# Patient Record
Sex: FEMALE | Race: WHITE | NOT HISPANIC OR LATINO | Employment: FULL TIME | URBAN - NONMETROPOLITAN AREA
[De-identification: names, ages, dates, MRNs, and addresses within clinical notes are randomized per-mention and may not be internally consistent; named-entity substitution may affect disease eponyms.]

---

## 2017-03-23 ENCOUNTER — HOSPITAL ENCOUNTER (OUTPATIENT)
Dept: PHYSICAL THERAPY | Facility: HOSPITAL | Age: 27
Setting detail: THERAPIES SERIES
Discharge: HOME | End: 2017-03-23

## 2017-03-23 DIAGNOSIS — M54.50 ACUTE BILATERAL LOW BACK PAIN WITHOUT SCIATICA: Primary | ICD-10-CM

## 2017-03-23 PROCEDURE — 97163 PT EVAL HIGH COMPLEX 45 MIN: CPT | Performed by: PHYSICAL THERAPIST

## 2017-03-23 PROCEDURE — 97010 HOT OR COLD PACKS THERAPY: CPT | Performed by: PHYSICAL THERAPIST

## 2017-03-23 PROCEDURE — G0283 ELEC STIM OTHER THAN WOUND: HCPCS | Performed by: PHYSICAL THERAPIST

## 2017-03-23 NOTE — PROGRESS NOTES
Outpatient Physical Therapy Ortho Initial Evaluation   [place]     Patient Name: Jolly Ayala  : 1990  MRN: [id number]  Today's Date: 3/23/2017      Visit Date: 2017    There is no problem list on file for this patient.       History reviewed. No pertinent past medical history.     History reviewed. No pertinent surgical history.    Visit Dx:     ICD-10-CM ICD-9-CM   1. Acute bilateral low back pain without sciatica M54.5 724.2     338.19             Patient History       17 1300          History    Chief Complaint Pain  -AD      Type of Pain Back pain  -AD      Date Current Problem(s) Began 17  -AD      Brief Description of Current Complaint Pt reported doing 55 dead lifts during cross fit, while wearing a belt. She stated after she did the dead lifts and attempted wall balls, she experienced low back pain. She stated she woke up the next morning with severe pain, bringing her to tears. She reports she used a TENS unit which provided no relief. She states laying down flat provides relief, but once she has to go from sit<>stand she experiences an increase in pain. She reports attempting to work out yesterday, but experienced increased pain this morning. She reports being a  and is having difficulty performing her daily work activities, including getting into and out of cars.    -AD      Onset Date- PT 3/23/17  -AD      Patient/Caregiver Goals Relieve pain;Return to prior level of function;Improve mobility;Improve strength  -AD      Current Tobacco Use None  -AD      Smoking Status Non  -AD      Patient's Rating of General Health Very good  -AD      Hand Dominance right-handed  -AD      Occupation/sports/leisure activities   -AD      How has patient tried to help current problem? Massage  -AD      Are you or can you be pregnant No  -AD      Pain     Pain Location Back  -AD      Pain at Present 4  -AD      Pain at Best 4  -AD      Pain at Worst 9   Pt reports  gradual improvement  -AD      Pain Frequency Constant/continuous  -AD      Pain Description Sharp  -AD      What Performance Factors Make the Current Problem(s) WORSE? Movement, bending  -AD      What Performance Factors Make the Current Problem(s) BETTER? Rest, lying flat  -AD      Difficulties at work? Running, getting into and out of cars  -AD      Difficulties with ADL's? Washing, dressing  -AD      Fall Risk Assessment    Any falls in the past year: No  -AD      Daily Activities    Primary Language English  -AD      Are you able to read Yes  -AD      Are you able to write Yes  -AD      How does patient learn best? Listening  -AD      Pt Participated in POC and Goals Yes  -AD      Safety    Are you being hurt, hit, or frightened by anyone at home or in your life? No  -AD      Are you being neglected by a caregiver No  -AD        User Key  (r) = Recorded By, (t) = Taken By, (c) = Cosigned By    Initials Name Provider Type    AD Ashley Claudene Dalton, PT Physical Therapist                PT Ortho       03/23/17 1300    Subjective Pain    Able to rate subjective pain? yes  -AD    Pre-Treatment Pain Level 4  -AD    Post-Treatment Pain Level 3  -AD    Posture/Observations    Posture/Observations Comments Left lateral lean, anterior rotation of left innominate   -AD    Sensation    Sensation WNL? WNL  -AD    DTR- Lower Quarter Clearing    Patellar tendon (L2-4) Bilateral:;2- Normal response  -AD    Achilles tendon (S1-2) Bilateral:;2- Normal response  -AD    Neural Tension Signs- Lower Quarter Clearing    Slump Bilateral:;Negative  -AD    SLR Bilateral:;Negative  -AD    Pathological Reflexes- Lower Quarter Clearing    Clonus Bilateral:;Negative  -AD    Sensory Screen for Light Touch- Lower Quarter Clearing    L1 (inguinal area) Bilateral:;Intact  -AD    L2 (anterior mid thigh) Bilateral:;Intact  -AD    L3 (distal anterior thigh) Bilateral:;Intact  -AD    L4 (medial lower leg/foot) Bilateral:;Intact  -AD    L5  (lateral lower leg/great toe) Bilateral:;Intact  -AD    S1 (bottom of foot) Bilateral:;Intact  -AD    Myotomal Screen- Lower Quarter Clearing    Hip flexion (L2) Right:;4+ (Good +);Left:;4 (Good)  -AD    Knee extension (L3) Bilateral:;4+ (Good +)  -AD    Knee flexion (S2) Right:;4+ (Good +);Left:;4 (Good)  -AD    Lumbar ROM Screen- Lower Quarter Clearing    Lumbar Flexion --   75%  -AD    Lumbar Extension --   25%  -AD    Lumbar Lateral Flexion --   Bilateral 50%  -AD    Lumbar Rotation --   Left 50%, right 75%  -AD    SI/Hip Screen- Lower Quarter Clearing    ASIS compression Bilateral:;Negative  -AD    ASIS distraction Bilateral:;Negative  -AD    Solomon's/Samuel's test Bilateral:;Negative  -AD    Posterior thigh sheer Bilateral:;Negative  -AD    Gait Assessment/Treatment    Gait, Impairments ROM decreased;pain;unable to maintain weight bearing status  -AD    Gait, Comment Left lateral lean with decreased RLE weightbearing  -AD      User Key  (r) = Recorded By, (t) = Taken By, (c) = Cosigned By    Initials Name Provider Type    AD Ashley Claudene Dalton, PT Physical Therapist                            Therapy Education       03/23/17 1437          Therapy Education    Given HEP;Pain management;Posture/body mechanics;Mobility training  -AD      Program New  -AD      How Provided Verbal;Demonstration;Written  -AD      Provided to Patient  -AD      Level of Understanding Verbalized;Demonstrated;Teach back education performed  -AD        User Key  (r) = Recorded By, (t) = Taken By, (c) = Cosigned By    Initials Name Provider Type    AD Ashley Claudene Dalton, PT Physical Therapist                PT OP Goals       03/23/17 1400       PT Short Term Goals    STG Date to Achieve 04/06/17  -AD     STG 1 Pt will demonstrate proper understanding of HEP for improved independence.  -AD     STG 1 Progress New  -AD     STG 2 Pt will demonstrate a 25% improvement on lumbar range of motion for improved mobility and function.  -AD      STG 2 Progress New  -AD     Long Term Goals    LTG Date to Achieve 04/22/17  -AD     LTG 1 Pt will report less than 10% impairment on the Modified Oswestry for improved functional independence at home and work.  -AD     LTG 1 Progress New  -AD     LTG 2 Pt will report no more than 1/10 low back pain during work activities for improved function and safety.  -AD     LTG 2 Progress New  -AD     LTG 3 Pt will demonstrate lumbar ROM within normal limits for improved mobility.  -AD     LTG 3 Progress New  -AD     LTG 4 Pt will perform dynamic strengthening activities with proper form and no increase in pain.  -AD     LTG 4 Progress New  -AD     Time Calculation    PT Goal Re-Cert Due Date 04/22/17  -AD       User Key  (r) = Recorded By, (t) = Taken By, (c) = Cosigned By    Initials Name Provider Type    AD Ashley Claudene Dalton, PT Physical Therapist                PT Assessment/Plan       03/23/17 1442       PT Assessment    Functional Limitations Impaired gait;Limitation in home management;Limitations in community activities;Performance in work activities;Performance in sport activities;Performance in self-care ADL;Performance in leisure activities;Limitations in functional capacity and performance;Decreased safety during functional activities  -AD     Impairments Gait;Range of motion;Motor function;Muscle strength;Pain;Posture;Joint mobility;Joint integrity  -AD     Assessment Comments The patient is  27 year old  presenting to the clinic with low back pain. She injured her lower back while participating in a cross fit workout. Due to the pain, she is currently limited with lumbar range of motion and BLE strength. She has difficulty transferring from sit<>stand, running, and getting in/out of cars easily due to pain. Per pt report, these limitations are affecting her ability to perform job duties.Upon inspection, the patient's left innominate was rotated anteriorly resulting in apparent leg length  discrepancy. Muscle energy techniques were used to correct the rotation with landmarks appearing even upon inspection/palpation. She would benefit from skilled physical therapy and will be progressed as tolerated.  -AD     Please refer to paper survey for additional self-reported information Yes  -AD     Rehab Potential Excellent  -AD     Patient/caregiver participated in establishment of treatment plan and goals Yes  -AD     Patient would benefit from skilled therapy intervention Yes  -AD     PT Plan    PT Frequency 2x/week;3x/week  -AD     Predicted Duration of Therapy Intervention (days/wks) 4 weeks  -AD     Planned CPT's? PT EVAL HIGH COMPLEXITY: 45119;PT RE-EVAL: 36490;PT THER PROC EA 15 MIN: 65548;PT GAIT TRAINING EA 15 MIN: 15634;PT NEUROMUSC RE-EDUCATION EA 15 MIN: 80561;PT MANUAL THERAPY EA 15 MIN: 45056;PT THER ACT EA 15 MIN: 13136;PT SELF CARE/HOME MGMT/TRAIN EA 15: 67612;PT ELECTRICAL STIM UNATTEND: ;PT ULTRASOUND EA 15 MIN: 17580;PT THER SUPP EA 15 MIN;PT IONTOPHORESIS EA 15 MIN: 97028  -AD     Physical Therapy Interventions (Optional Details) balance training;gait training;gross motor skills;home exercise program;joint mobilization;postural re-education;patient/family education;neuromuscular re-education;motor coordination training;stretching;modalities;strengthening;manual therapy techniques;lumbar stabilization;ROM (Range of Motion)  -AD     PT Plan Comments The above noted interventions will be used to address functional limitations and impairments previously discussed. She has an excellent prognosis to achieve established physical therapy goals and will be progressed as tolerated.  -AD       User Key  (r) = Recorded By, (t) = Taken By, (c) = Cosigned By    Initials Name Provider Type    AD Ashley Claudene Dalton, PT Physical Therapist                Modalities       03/23/17 1300          Moist Heat     Applied Yes  -AD      Location Low back  -AD      Rx Minutes 15 mins  -AD       S/P Rx  Yes   With IFC, no adverse reactions observed  -AD      ELECTRICAL STIMULATION    Attended/Unattended Unattended  -AD      Stimulation Type IFC  -AD      Max mAmp --   Per pt tolerance  -AD      Location/Electrode Placement/Other Lumbar  -AD      Rx Minutes 15 mins  -AD        User Key  (r) = Recorded By, (t) = Taken By, (c) = Cosigned By    Initials Name Provider Type    AD Ashley Claudene Dalton, PT Physical Therapist              Exercises       03/23/17 1300          Subjective Pain    Able to rate subjective pain? yes  -AD      Pre-Treatment Pain Level 4  -AD      Post-Treatment Pain Level 3  -AD      Exercise 1    Exercise Name 1 SKTC, piriformis stretch, LTR  -AD      Cueing 1 Verbal;Tactile;Demo   HEP  -AD        User Key  (r) = Recorded By, (t) = Taken By, (c) = Cosigned By    Initials Name Provider Type    AD Ashley Claudene Dalton, PT Physical Therapist                              Outcome Measures       03/23/17 1400          Modified Oswestry    Modified Oswestry Score/Comments 16/50  -AD      Functional Assessment    Outcome Measure Options Modifed Owestry  -AD        User Key  (r) = Recorded By, (t) = Taken By, (c) = Cosigned By    Initials Name Provider Type    AD Ashley Claudene Dalton, PT Physical Therapist            Time Calculation:   Start Time: 1315  Stop Time: 1435  Time Calculation (min): 80 min     Therapy Charges for Today     Code Description Service Date Service Provider Modifiers Qty    50887800273 HC ELECTRICAL STIM UNATTENDED 3/23/2017 Ashley Claudene Dalton, PT  1    71537740007 HC PT HOT/COLD PACK WC NONMCARE 3/23/2017 Ashley Claudene Dalton, PT GP 1    80483904103 HC PT EVAL HIGH COMPLEXITY 4 3/23/2017 Ashley Claudene Dalton, PT GP 1          PT G-Codes  Outcome Measure Options: Modifed Owestry         Ashley Claudene Dalton, PT  3/23/2017

## 2017-03-29 ENCOUNTER — TRANSCRIBE ORDERS (OUTPATIENT)
Dept: PHYSICAL THERAPY | Facility: HOSPITAL | Age: 27
End: 2017-03-29

## 2017-03-29 DIAGNOSIS — M54.5 LOW BACK PAIN, UNSPECIFIED BACK PAIN LATERALITY, UNSPECIFIED CHRONICITY, WITH SCIATICA PRESENCE UNSPECIFIED: Primary | ICD-10-CM

## 2017-04-05 ENCOUNTER — HOSPITAL ENCOUNTER (OUTPATIENT)
Dept: PHYSICAL THERAPY | Facility: HOSPITAL | Age: 27
Setting detail: THERAPIES SERIES
Discharge: HOME | End: 2017-04-05

## 2017-04-05 DIAGNOSIS — M54.50 ACUTE BILATERAL LOW BACK PAIN WITHOUT SCIATICA: Primary | ICD-10-CM

## 2017-04-05 PROCEDURE — G0283 ELEC STIM OTHER THAN WOUND: HCPCS

## 2017-04-05 PROCEDURE — 97110 THERAPEUTIC EXERCISES: CPT

## 2017-04-05 NOTE — PROGRESS NOTES
Outpatient Physical Therapy Ortho Treatment Note   [place]     Patient Name: Jolly Ayala  : 1990  MRN: [id number]  Today's Date: 2017      Visit Date: 2017    Visit Dx:    ICD-10-CM ICD-9-CM   1. Acute bilateral low back pain without sciatica M54.5 724.2     338.19       There is no problem list on file for this patient.       No past medical history on file.     No past surgical history on file.          PT Ortho       17 1200    Subjective Comments    Subjective Comments Patient states of continued pain in her back. Patient reports of not performing exercises at the gym due to her back pain.   -    Subjective Pain    Able to rate subjective pain? yes  -    Pre-Treatment Pain Level 4  -    Post-Treatment Pain Level 3  -      User Key  (r) = Recorded By, (t) = Taken By, (c) = Cosigned By    Initials Name Provider Type    JOSE DAVID Rogers PTA Physical Therapy Assistant                            PT Assessment/Plan       17 1227       PT Assessment    Assessment Comments Patient tolerated treatment good with no increase in pain noted during treatment. New ther ex added per the patient's tolerance, patient demonstrated and understood new ther ex with no increase in pain. Educated patient to perform ther ex per her tolerance, patient verbalized understanding. Jolly Orellana, PT, DPT assessed innominate for any rotations, none were noted. No adverse reactions with modalities or treatment.   -     PT Plan    PT Plan Comments Continue per PT's POC, progress per the patient's tolerance.  -       User Key  (r) = Recorded By, (t) = Taken By, (c) = Cosigned By    Initials Name Provider Type    JOSE DAVID Rogers PTA Physical Therapy Assistant                Modalities       17 1200          Moist Heat    MH Applied Yes   No redness noted following moist heat  -      Location Low back  -      Rx Minutes 15 mins  -      MH S/P Rx Yes  -      ELECTRICAL  STIMULATION    Attended/Unattended Unattended   No irritation noted following estim  -AH      Stimulation Type IFC  -      Max mAmp --   per the patient's tolerance  -      Location/Electrode Placement/Other Lumbar  -      Rx Minutes 15 mins  -        User Key  (r) = Recorded By, (t) = Taken By, (c) = Cosigned By    Initials Name Provider Type    JOSE DAVID Rogers PTA Physical Therapy Assistant                Exercises       04/05/17 1200          Subjective Comments    Subjective Comments Patient states of continued pain in her back. Patient reports of not performing exercises at the gym due to her back pain.   -      Subjective Pain    Able to rate subjective pain? yes  -      Pre-Treatment Pain Level 4  -      Post-Treatment Pain Level 3  -      Exercise 1    Exercise Name 1 SKTC 20 sec hold x3, piriformis stretch 20 sec hold x3, LTR 25x, PPT 10x2, QS 10x2, SLR 10x2, knee flex with GTB x25, cat camel x15, bird dogs x10, mini squats 15x, lat pull with GTB 15x, LE bike LV 2 8 min   Jolly Orellana PT, DPT assisted with beginning ther ex.  -      Cueing 1 Verbal;Tactile;Demo  -      Time (Minutes) 1 40 minutes  -        User Key  (r) = Recorded By, (t) = Taken By, (c) = Cosigned By    Initials Name Provider Type    JOSE DAVID Rogers PTA Physical Therapy Assistant                                   Therapy Education       04/05/17 1226          Therapy Education    Given HEP;Pain management;Symptoms/condition management;Posture/body mechanics  -      Program New  -      How Provided Verbal  -      Provided to Patient  -      Level of Understanding Verbalized;Demonstrated;Teach back education performed  -        User Key  (r) = Recorded By, (t) = Taken By, (c) = Cosigned By    Initials Name Provider Type    JOSE DAVID Rogers PTA Physical Therapy Assistant                Time Calculation:   Start Time: 1000  Stop Time: 1100  Time Calculation (min): 60 min    Therapy  Charges for Today     Code Description Service Date Service Provider Modifiers Qty    82974768911 HC PT THER PROC EA 15 MIN 4/5/2017 Jolly Rogers PTA GP 3    87062398032 HC PT ELECTRICAL STIM UNATTENDED 4/5/2017 Jolly Rogers PTA  1    [id number]  PT THER SUPP EA 15 MIN 4/5/2017 Jolly Rogers PTA GP 1                    Jolly Rogers, ARSENIO  4/5/2017

## 2017-04-06 ENCOUNTER — HOSPITAL ENCOUNTER (OUTPATIENT)
Dept: PHYSICAL THERAPY | Facility: HOSPITAL | Age: 27
Setting detail: THERAPIES SERIES
Discharge: HOME | End: 2017-04-06

## 2017-04-06 DIAGNOSIS — M54.50 ACUTE BILATERAL LOW BACK PAIN WITHOUT SCIATICA: Primary | ICD-10-CM

## 2017-04-06 PROCEDURE — 97110 THERAPEUTIC EXERCISES: CPT

## 2017-04-06 PROCEDURE — 97010 HOT OR COLD PACKS THERAPY: CPT

## 2017-04-06 PROCEDURE — G0283 ELEC STIM OTHER THAN WOUND: HCPCS

## 2017-04-06 NOTE — PROGRESS NOTES
Outpatient Physical Therapy Ortho Treatment Note   [place]     Patient Name: Jolly Ayala  : 1990  MRN: [id number]  Today's Date: 2017      Visit Date: 2017    Visit Dx:    ICD-10-CM ICD-9-CM   1. Acute bilateral low back pain without sciatica M54.5 724.2     338.19       There is no problem list on file for this patient.       No past medical history on file.     No past surgical history on file.          PT Ortho       17 1200    Subjective Comments    Subjective Comments Patient reports that she was sore after last session but she wants to do more activities. Patient reports still not performing cross fit activities  -    Subjective Pain    Able to rate subjective pain? yes  -    Pre-Treatment Pain Level 3  -    Post-Treatment Pain Level 3  -      17 1200    Subjective Comments    Subjective Comments Patient states of continued pain in her back. Patient reports of not performing exercises at the gym due to her back pain.   -    Subjective Pain    Able to rate subjective pain? yes  -    Pre-Treatment Pain Level 4  -    Post-Treatment Pain Level 3  -      User Key  (r) = Recorded By, (t) = Taken By, (c) = Cosigned By    Initials Name Provider Type     Jolly Rogers, PTA Physical Therapy Assistant                            PT Assessment/Plan       17 1258       PT Assessment    Assessment Comments New ther ex added per the patient's tolerance, patient demonstrated and understood new ther ex with no increase in pain. Patient tolerated treatment well with mild increase in soreness with cat camels. Jolly Orellana PT, DPT assessed patient during push ups and patient continues to dip her R hip so push ups stopped at this time until low back is strengthed. No adverse reactions with modalities or treatment.   -     PT Plan    PT Plan Comments Continue per PT's POC, progress per the patient's tolerance.  -       User Key  (r) = Recorded By, (t) = Taken  By, (c) = Cosigned By    Initials Name Provider Type     Jolly oRgers PTA Physical Therapy Assistant                Modalities       04/06/17 1200          Moist Heat    MH Applied Yes   No redness noted following moist heat  -      Location Low back  -AH      Rx Minutes 15 mins  -AH      MH Prior to Rx Yes  -AH      ELECTRICAL STIMULATION    Attended/Unattended Unattended   No irritation noted following estim  -AH      Stimulation Type IFC  -AH      Max mAmp --   per the patient's tolerance  -      Location/Electrode Placement/Other Lumbar  -AH      Rx Minutes 15 mins  -        User Key  (r) = Recorded By, (t) = Taken By, (c) = Cosigned By    Initials Name Provider Type     Jolly Rogers PTA Physical Therapy Assistant                Exercises       04/06/17 1200          Subjective Comments    Subjective Comments Patient reports that she was sore after last session but she wants to do more activities. Patient reports still not performing cross fit activities  -      Subjective Pain    Able to rate subjective pain? yes  -AH      Pre-Treatment Pain Level 3  -AH      Post-Treatment Pain Level 3  -      Exercise 1    Exercise Name 1 SKTC 20 sec hold x3, piriformis stretch 20 sec hold x3, LTR 15x2, SLR 1# 10x3, knee flex with GTB 15x2, cat camel 15x, bird dogs 15x, mini squats 10x2, lat pull with GTB 15x2, mid row with GTB 10x2, St. hip abd 15x, wall push up 15x, SLS 20 sec, lunges 15x, push ups 5x, swiss ball opposite arm/leg 15x  -      Cueing 1 Verbal;Tactile;Demo  -      Time (Minutes) 1 40 minutes  -      Treatment Type 1 Ther Ex  -        User Key  (r) = Recorded By, (t) = Taken By, (c) = Cosigned By    Initials Name Provider Type     Jolly Rogers PTA Physical Therapy Assistant                                   Therapy Education       04/06/17 1253          Therapy Education    Given HEP;Symptoms/condition management;Pain management;Posture/body mechanics  -       Program New  -      How Provided Verbal  -AH      Provided to Patient  -      Level of Understanding Verbalized;Demonstrated  -AH        User Key  (r) = Recorded By, (t) = Taken By, (c) = Cosigned By    Initials Name Provider Type     Jolly Rogers PTA Physical Therapy Assistant                Time Calculation:   Start Time: 1000  Stop Time: 1100  Time Calculation (min): 60 min    Therapy Charges for Today     Code Description Service Date Service Provider Modifiers Qty    35921036824 HC PT THER SUPP EA 15 MIN 4/6/2017 Jolly Rogers PTA GP 1    01565461729 HC PT THER PROC EA 15 MIN 4/6/2017 Jolly Rogers PTA GP 3    10340629082 HC PT ELECTRICAL STIM UNATTENDED 4/6/2017 Jolly Rogers PTA  1    29142094379 HC PT HOT/COLD PACK WC NONMCARE 4/6/2017 Jolly Rogers PTA GP 1                    Jolly Rogers, PTA  4/6/2017

## 2017-04-11 ENCOUNTER — HOSPITAL ENCOUNTER (OUTPATIENT)
Dept: PHYSICAL THERAPY | Facility: HOSPITAL | Age: 27
Setting detail: THERAPIES SERIES
Discharge: HOME | End: 2017-04-11

## 2017-04-11 DIAGNOSIS — M54.50 ACUTE BILATERAL LOW BACK PAIN WITHOUT SCIATICA: Primary | ICD-10-CM

## 2017-04-11 PROCEDURE — 97010 HOT OR COLD PACKS THERAPY: CPT | Performed by: PHYSICAL THERAPIST

## 2017-04-11 PROCEDURE — 97110 THERAPEUTIC EXERCISES: CPT | Performed by: PHYSICAL THERAPIST

## 2017-04-11 PROCEDURE — G0283 ELEC STIM OTHER THAN WOUND: HCPCS | Performed by: PHYSICAL THERAPIST

## 2017-04-11 NOTE — PROGRESS NOTES
Outpatient Physical Therapy Ortho Treatment Note   Kranthi     Patient Name: Jolly Ayala  : 1990  MRN: [id number]  Today's Date: 2017      Visit Date: 2017    Visit Dx:    ICD-10-CM ICD-9-CM   1. Acute bilateral low back pain without sciatica M54.5 724.2     338.19       There is no problem list on file for this patient.       No past medical history on file.     No past surgical history on file.          PT Ortho       17 1100    Subjective Comments    Subjective Comments Pt reported mild soreness prior to today's treatment session, as well as following her session on 17. She states she has been performing her HEP as instructed.  -AD    Subjective Pain    Able to rate subjective pain? yes  -AD    Pre-Treatment Pain Level 3  -AD    Post-Treatment Pain Level 3  -AD      User Key  (r) = Recorded By, (t) = Taken By, (c) = Cosigned By    Initials Name Provider Type    AD Ashley Claudene Dalton, PT Physical Therapist                            PT Assessment/Plan       17 1126       PT Assessment    Assessment Comments Pt tolerated the session well with no reports of increased pain. She was progressed with therapeutic exercises to include increased resistance and core involvement. She will continue to be progressed to improve core strength, lumbar stability, mobility, and function.   -AD     PT Plan    PT Plan Comments Progress as tolerated per POC.  -AD       User Key  (r) = Recorded By, (t) = Taken By, (c) = Cosigned By    Initials Name Provider Type    AD Ashley Claudene Dalton, PT Physical Therapist                Modalities       17 1100          Moist Heat    Location Low back  -AD      Rx Minutes 10 mins  -AD      MH Prior to Rx Yes  -AD      ELECTRICAL STIMULATION    Attended/Unattended Unattended   No irritation noted following estim, combined with MH  -AD      Stimulation Type IFC  -AD      Max mAmp --   per the patient's tolerance  -AD      Location/Electrode  "Placement/Other Lumbar  -AD      Rx Minutes 10 mins  -AD        User Key  (r) = Recorded By, (t) = Taken By, (c) = Cosigned By    Initials Name Provider Type    AD Ashley Claudene Dalton, PT Physical Therapist                Exercises       04/11/17 1100          Subjective Comments    Subjective Comments Pt reported mild soreness prior to today's treatment session, as well as following her session on 4/6/17. She states she has been performing her HEP as instructed.  -AD      Subjective Pain    Able to rate subjective pain? yes  -AD      Pre-Treatment Pain Level 3  -AD      Post-Treatment Pain Level 3  -AD      Exercise 1    Exercise Name 1 SKTC, piriformis stretch, LTR, LAQ with 1#, tband knee flexion, tband hip abduction, standing hip abduction, lunges, \"w\" lunges, side step with GTB, mini squats, lat pull, planks, quadruped alternating arm/leg, planks, plank with oblique involvement, LE bicycle  -AD      Cueing 1 Verbal;Tactile  -AD      Time (Minutes) 1 55 minutes  -AD      Treatment Type 1 Ther Ex  -AD        User Key  (r) = Recorded By, (t) = Taken By, (c) = Cosigned By    Initials Name Provider Type    AD Ashley Claudene Dalton, PT Physical Therapist                                   Therapy Education       04/11/17 1126          Therapy Education    Given HEP;Symptoms/condition management;Pain management;Posture/body mechanics  -AD      Program Reinforced  -AD      How Provided Verbal  -AD      Provided to Patient  -AD      Level of Understanding Verbalized;Demonstrated  -AD        User Key  (r) = Recorded By, (t) = Taken By, (c) = Cosigned By    Initials Name Provider Type    AD Ashley Claudene Dalton, PT Physical Therapist                Time Calculation:   Start Time: 1000  Stop Time: 1110  Time Calculation (min): 70 min    Therapy Charges for Today     Code Description Service Date Service Provider Modifiers Qty    79171756607 HC PT HOT/COLD PACK WC NONMCARE 4/11/2017 Ashley Claudene Dalton, PT GP 1    " 67260808816 HC PT ELECTRICAL STIM UNATTENDED 4/11/2017 Ashley Claudene Dalton, PT  1    07356510964 HC PT THER PROC EA 15 MIN 4/11/2017 Ashley Claudene Dalton, PT GP 4                    Ashley Claudene Dalton, PT  4/11/2017

## 2017-04-14 ENCOUNTER — HOSPITAL ENCOUNTER (OUTPATIENT)
Dept: PHYSICAL THERAPY | Facility: HOSPITAL | Age: 27
Setting detail: THERAPIES SERIES
Discharge: HOME | End: 2017-04-14

## 2017-04-14 DIAGNOSIS — M54.50 ACUTE BILATERAL LOW BACK PAIN WITHOUT SCIATICA: Primary | ICD-10-CM

## 2017-04-14 PROCEDURE — 97010 HOT OR COLD PACKS THERAPY: CPT | Performed by: PHYSICAL THERAPIST

## 2017-04-14 PROCEDURE — 97110 THERAPEUTIC EXERCISES: CPT | Performed by: PHYSICAL THERAPIST

## 2017-04-14 PROCEDURE — G0283 ELEC STIM OTHER THAN WOUND: HCPCS | Performed by: PHYSICAL THERAPIST

## 2017-04-14 NOTE — PROGRESS NOTES
Outpatient Physical Therapy Ortho Treatment Note   Kranthi     Patient Name: Jolly Ayala  : 1990  MRN: [id number]  Today's Date: 2017      Visit Date: 2017    Visit Dx:    ICD-10-CM ICD-9-CM   1. Acute bilateral low back pain without sciatica M54.5 724.2     338.19       There is no problem list on file for this patient.       No past medical history on file.     No past surgical history on file.          PT Ortho       17 1100    Subjective Comments    Subjective Comments Pt reported mild soreness prior to the treatment session.  -AD    Subjective Pain    Able to rate subjective pain? yes  -AD    Pre-Treatment Pain Level 2  -AD    Post-Treatment Pain Level 1  -AD      User Key  (r) = Recorded By, (t) = Taken By, (c) = Cosigned By    Initials Name Provider Type    AD Ashley Claudene Dalton, PT Physical Therapist                            PT Assessment/Plan       17 1106       PT Assessment    Assessment Comments Pt tolerated session well with progression to include light jog on the treadmill. The patient also performed therapeutic exercises with a 3# ankle weight. She reported low back tightness following the treadmill, therefore low back stretches were repeated. She will continue to be progressed as tolerated.  -AD     PT Plan    PT Plan Comments Progress as tolerated per POC.  -AD       User Key  (r) = Recorded By, (t) = Taken By, (c) = Cosigned By    Initials Name Provider Type    AD Ashley Claudene Dalton, PT Physical Therapist                Modalities       17 1100          Moist Heat    MH Applied Yes  -AD      Location Low back  -AD      Rx Minutes 10 mins  -AD      MH Prior to Rx Yes  -AD      ELECTRICAL STIMULATION    Attended/Unattended Unattended   No irritation noted following estim, combined with MH  -AD      Stimulation Type IFC  -AD      Max mAmp --   per the patient's tolerance  -AD      Location/Electrode Placement/Other Lumbar  -AD      Rx Minutes 10  "mins  -AD        User Key  (r) = Recorded By, (t) = Taken By, (c) = Cosigned By    Initials Name Provider Type    AD Ashley Claudene Dalton, PT Physical Therapist                Exercises       04/14/17 1100          Subjective Comments    Subjective Comments Pt reported mild soreness prior to the treatment session.  -AD      Subjective Pain    Able to rate subjective pain? yes  -AD      Pre-Treatment Pain Level 2  -AD      Post-Treatment Pain Level 1  -AD      Exercise 1    Exercise Name 1 SKTC, piriformis stretch, LTR, bridges, LAQ with 3#, seated march with 3#, lunges with 3#, side step with 3#, \"w\" walk with 3#, treadmill run for 5 min, prostretch.  -AD      Time (Minutes) 1 40 minutes  -AD      Treatment Type 1 Ther Ex  -AD        User Key  (r) = Recorded By, (t) = Taken By, (c) = Cosigned By    Initials Name Provider Type    AD Ashley Claudene Dalton, PT Physical Therapist                                   Therapy Education       04/14/17 1106          Therapy Education    Given HEP;Symptoms/condition management;Pain management;Posture/body mechanics  -AD      Program Reinforced  -AD      How Provided Verbal  -AD      Provided to Patient  -AD      Level of Understanding Verbalized;Demonstrated  -AD        User Key  (r) = Recorded By, (t) = Taken By, (c) = Cosigned By    Initials Name Provider Type    AD Ashley Claudene Dalton, PT Physical Therapist                Time Calculation:   Start Time: 1010  Stop Time: 1100  Time Calculation (min): 50 min    Therapy Charges for Today     Code Description Service Date Service Provider Modifiers Qty    53716674798 HC PT ELECTRICAL STIM UNATTENDED 4/14/2017 Ashley Claudene Dalton, PT  1    99048110789 HC PT HOT/COLD PACK WC NONMCARE 4/14/2017 Ashley Claudene Dalton, PT GP 1    54557598801 HC PT THER PROC EA 15 MIN 4/14/2017 Ashley Claudene Dalton, PT GP 3                    Ashley Claudene Dalton, PT  4/14/2017     "

## 2017-05-18 ENCOUNTER — DOCUMENTATION (OUTPATIENT)
Dept: PHYSICAL THERAPY | Facility: HOSPITAL | Age: 27
End: 2017-05-18

## 2017-05-18 DIAGNOSIS — M54.50 ACUTE BILATERAL LOW BACK PAIN WITHOUT SCIATICA: Primary | ICD-10-CM

## 2019-09-04 LAB
EXTERNAL ABO GROUPING: NORMAL
EXTERNAL ANTIBODY SCREEN: NEGATIVE
EXTERNAL HEPATITIS B SURFACE ANTIGEN: NEGATIVE
EXTERNAL RH FACTOR: POSITIVE
EXTERNAL RUBELLA QUALITATIVE: NORMAL
EXTERNAL SYPHILIS RPR SCREEN: NORMAL
HIV1 P24 AG SERPL QL IA: NORMAL

## 2020-03-20 LAB
EXTERNAL GROUP B STREP ANTIGEN: NORMAL
EXTERNAL GROUP B STREP ANTIGEN: POSITIVE

## 2020-04-02 LAB
EXTERNAL CHLAMYDIA SCREEN: NEGATIVE
EXTERNAL GONORRHEA SCREEN: NEGATIVE

## 2020-04-16 ENCOUNTER — HOSPITAL ENCOUNTER (OUTPATIENT)
Facility: HOSPITAL | Age: 30
Discharge: HOME | End: 2020-04-16
Attending: OBSTETRICS & GYNECOLOGY | Admitting: OBSTETRICS & GYNECOLOGY

## 2020-04-16 VITALS
WEIGHT: 246 LBS | DIASTOLIC BLOOD PRESSURE: 71 MMHG | SYSTOLIC BLOOD PRESSURE: 129 MMHG | HEIGHT: 63 IN | BODY MASS INDEX: 43.59 KG/M2 | RESPIRATION RATE: 18 BRPM | TEMPERATURE: 98.2 F | HEART RATE: 94 BPM

## 2020-04-16 PROBLEM — R03.0 ELEVATED BP WITHOUT DIAGNOSIS OF HYPERTENSION: Status: ACTIVE | Noted: 2020-04-16

## 2020-04-16 LAB
ABO GROUP BLD: NORMAL
ALBUMIN SERPL-MCNC: 3.35 G/DL (ref 3.5–5.2)
ALBUMIN/GLOB SERPL: 1.2 G/DL
ALP SERPL-CCNC: 123 U/L (ref 39–117)
ALT SERPL W P-5'-P-CCNC: 13 U/L (ref 1–33)
ANION GAP SERPL CALCULATED.3IONS-SCNC: 15.9 MMOL/L (ref 5–15)
AST SERPL-CCNC: 19 U/L (ref 1–32)
BASOPHILS # BLD AUTO: 0.02 10*3/MM3 (ref 0–0.2)
BASOPHILS NFR BLD AUTO: 0.2 % (ref 0–1.5)
BILIRUB SERPL-MCNC: <0.2 MG/DL (ref 0.2–1.2)
BLD GP AB SCN SERPL QL: POSITIVE
BUN BLD-MCNC: 9 MG/DL (ref 6–20)
BUN/CREAT SERPL: 13.6 (ref 7–25)
CALCIUM SPEC-SCNC: 8.9 MG/DL (ref 8.6–10.5)
CHLORIDE SERPL-SCNC: 101 MMOL/L (ref 98–107)
CO2 SERPL-SCNC: 20.1 MMOL/L (ref 22–29)
CREAT BLD-MCNC: 0.66 MG/DL (ref 0.57–1)
DEPRECATED RDW RBC AUTO: 44.8 FL (ref 37–54)
EOSINOPHIL # BLD AUTO: 0.1 10*3/MM3 (ref 0–0.4)
EOSINOPHIL NFR BLD AUTO: 0.8 % (ref 0.3–6.2)
ERYTHROCYTE [DISTWIDTH] IN BLOOD BY AUTOMATED COUNT: 14.7 % (ref 12.3–15.4)
GFR SERPL CREATININE-BSD FRML MDRD: 105 ML/MIN/1.73
GLOBULIN UR ELPH-MCNC: 2.9 GM/DL
GLUCOSE BLD-MCNC: 65 MG/DL (ref 65–99)
HCT VFR BLD AUTO: 36.7 % (ref 34–46.6)
HGB BLD-MCNC: 11.8 G/DL (ref 12–15.9)
IMM GRANULOCYTES # BLD AUTO: 0.05 10*3/MM3 (ref 0–0.05)
IMM GRANULOCYTES NFR BLD AUTO: 0.4 % (ref 0–0.5)
LDH SERPL-CCNC: 244 U/L (ref 135–214)
LYMPHOCYTES # BLD AUTO: 2.61 10*3/MM3 (ref 0.7–3.1)
LYMPHOCYTES NFR BLD AUTO: 20.3 % (ref 19.6–45.3)
MCH RBC QN AUTO: 26.8 PG (ref 26.6–33)
MCHC RBC AUTO-ENTMCNC: 32.2 G/DL (ref 31.5–35.7)
MCV RBC AUTO: 83.2 FL (ref 79–97)
MONOCYTES # BLD AUTO: 0.93 10*3/MM3 (ref 0.1–0.9)
MONOCYTES NFR BLD AUTO: 7.2 % (ref 5–12)
NEUTROPHILS # BLD AUTO: 9.16 10*3/MM3 (ref 1.7–7)
NEUTROPHILS NFR BLD AUTO: 71.1 % (ref 42.7–76)
NRBC BLD AUTO-RTO: 0 /100 WBC (ref 0–0.2)
PLATELET # BLD AUTO: 173 10*3/MM3 (ref 140–450)
PMV BLD AUTO: 12.8 FL (ref 6–12)
POTASSIUM BLD-SCNC: 3.9 MMOL/L (ref 3.5–5.2)
PROT SERPL-MCNC: 6.2 G/DL (ref 6–8.5)
RBC # BLD AUTO: 4.41 10*6/MM3 (ref 3.77–5.28)
RESIDUAL RHIG DETECTED: NORMAL
RH BLD: NEGATIVE
SODIUM BLD-SCNC: 137 MMOL/L (ref 136–145)
T&S EXPIRATION DATE: NORMAL
URATE SERPL-MCNC: 5.5 MG/DL (ref 2.4–5.7)
WBC NRBC COR # BLD: 12.87 10*3/MM3 (ref 3.4–10.8)

## 2020-04-16 PROCEDURE — G0463 HOSPITAL OUTPT CLINIC VISIT: HCPCS

## 2020-04-16 PROCEDURE — 86901 BLOOD TYPING SEROLOGIC RH(D): CPT

## 2020-04-16 PROCEDURE — 36415 COLL VENOUS BLD VENIPUNCTURE: CPT | Performed by: OBSTETRICS & GYNECOLOGY

## 2020-04-16 PROCEDURE — 86900 BLOOD TYPING SEROLOGIC ABO: CPT

## 2020-04-16 PROCEDURE — 84550 ASSAY OF BLOOD/URIC ACID: CPT | Performed by: OBSTETRICS & GYNECOLOGY

## 2020-04-16 PROCEDURE — 80053 COMPREHEN METABOLIC PANEL: CPT | Performed by: OBSTETRICS & GYNECOLOGY

## 2020-04-16 PROCEDURE — 85025 COMPLETE CBC W/AUTO DIFF WBC: CPT | Performed by: OBSTETRICS & GYNECOLOGY

## 2020-04-16 PROCEDURE — 86900 BLOOD TYPING SEROLOGIC ABO: CPT | Performed by: OBSTETRICS & GYNECOLOGY

## 2020-04-16 PROCEDURE — 59025 FETAL NON-STRESS TEST: CPT

## 2020-04-16 PROCEDURE — 86901 BLOOD TYPING SEROLOGIC RH(D): CPT | Performed by: OBSTETRICS & GYNECOLOGY

## 2020-04-16 PROCEDURE — 86850 RBC ANTIBODY SCREEN: CPT | Performed by: OBSTETRICS & GYNECOLOGY

## 2020-04-16 PROCEDURE — 83615 LACTATE (LD) (LDH) ENZYME: CPT | Performed by: OBSTETRICS & GYNECOLOGY

## 2020-04-16 PROCEDURE — 86870 RBC ANTIBODY IDENTIFICATION: CPT | Performed by: OBSTETRICS & GYNECOLOGY

## 2020-04-16 RX ORDER — FAMOTIDINE 20 MG/1
20 TABLET, FILM COATED ORAL 2 TIMES DAILY PRN
Status: DISCONTINUED | OUTPATIENT
Start: 2020-04-16 | End: 2020-04-16 | Stop reason: HOSPADM

## 2020-04-16 RX ORDER — METOCLOPRAMIDE HYDROCHLORIDE 5 MG/ML
10 INJECTION INTRAMUSCULAR; INTRAVENOUS EVERY 6 HOURS PRN
Status: DISCONTINUED | OUTPATIENT
Start: 2020-04-16 | End: 2020-04-16 | Stop reason: HOSPADM

## 2020-04-16 RX ORDER — ONDANSETRON 4 MG/1
4 TABLET, FILM COATED ORAL EVERY 8 HOURS PRN
Status: DISCONTINUED | OUTPATIENT
Start: 2020-04-16 | End: 2020-04-16 | Stop reason: HOSPADM

## 2020-04-16 RX ORDER — ACETAMINOPHEN 325 MG/1
650 TABLET ORAL EVERY 4 HOURS PRN
Status: DISCONTINUED | OUTPATIENT
Start: 2020-04-16 | End: 2020-04-16 | Stop reason: HOSPADM

## 2020-04-16 RX ORDER — DIPHENHYDRAMINE HCL 25 MG
25 CAPSULE ORAL EVERY 4 HOURS PRN
Status: DISCONTINUED | OUTPATIENT
Start: 2020-04-16 | End: 2020-04-16 | Stop reason: HOSPADM

## 2020-04-16 RX ORDER — DIPHENHYDRAMINE HYDROCHLORIDE 50 MG/ML
25 INJECTION INTRAMUSCULAR; INTRAVENOUS EVERY 4 HOURS PRN
Status: DISCONTINUED | OUTPATIENT
Start: 2020-04-16 | End: 2020-04-16 | Stop reason: HOSPADM

## 2020-04-16 RX ORDER — ONDANSETRON 2 MG/ML
4 INJECTION INTRAMUSCULAR; INTRAVENOUS EVERY 8 HOURS PRN
Status: DISCONTINUED | OUTPATIENT
Start: 2020-04-16 | End: 2020-04-16 | Stop reason: HOSPADM

## 2020-04-16 NOTE — NON STRESS TEST
Jolly Velazquez, a  at 39w4d with an VARUN of 2020, by Patient Reported, was seen at [facility] LABOR DELIVERY for a nonstress test.    Chief Complaint   Patient presents with   • Elevated Blood Pressure       Patient Active Problem List   Diagnosis   • Elevated BP without diagnosis of hypertension       Start Time:   Stop Time:     Interpretation A  Nonstress Test Interpretation A: Reactive (20 : Latrice Holliday, RN)  Comments A: verified by bobo jimenez rn (20 : Latrice Holliday, RN)

## 2020-04-16 NOTE — NURSING NOTE
PT STATES SHE DOES NOT WANT TO BE INDUCED. STATES SHE DOES NOT WANT TO FORCE HER BODY IF IT IS NOT READY. DR. AGUERO NOTIFIED OF PT REQUEST. MD ORDERS FOR PT TO CALL FOR F/U APPT ON Monday, AND TO COME TO L&D ON Saturday FOR NST AND BP CHECK. PT EDUCATED ON RISKS OF NOT DELIVERING WITH [facility], INCLUDING SEIZURES, AND MATERNAL/FETAL DEATH. [place].

## 2020-04-18 ENCOUNTER — HOSPITAL ENCOUNTER (OUTPATIENT)
Facility: HOSPITAL | Age: 30
Discharge: HOME | End: 2020-04-18
Attending: OBSTETRICS & GYNECOLOGY | Admitting: OBSTETRICS & GYNECOLOGY

## 2020-04-18 VITALS
WEIGHT: 243.2 LBS | HEIGHT: 63 IN | RESPIRATION RATE: 20 BRPM | BODY MASS INDEX: 43.09 KG/M2 | SYSTOLIC BLOOD PRESSURE: 126 MMHG | TEMPERATURE: 98 F | HEART RATE: 77 BPM | DIASTOLIC BLOOD PRESSURE: 75 MMHG

## 2020-04-18 PROBLEM — Z34.90 PREGNANCY: Status: ACTIVE | Noted: 2020-04-18

## 2020-04-18 PROCEDURE — 59025 FETAL NON-STRESS TEST: CPT

## 2020-04-18 PROCEDURE — G0463 HOSPITAL OUTPT CLINIC VISIT: HCPCS

## 2020-04-18 NOTE — NON STRESS TEST
Jolly Velazquez, a  at 39w6d with an VARUN of 2020, by Patient Reported, was seen at [facility] LABOR DELIVERY for a nonstress test.    Chief Complaint   Patient presents with   • Non-stress Test     elevated bp last visit       Patient Active Problem List   Diagnosis   • Elevated BP without diagnosis of hypertension   • Pregnancy       Start Time: 1532  Stop Time: 1608    Interpretation A  Nonstress Test Interpretation A: Reactive (20 1622 : Latrice Holliday, RN)  Comments A: verified by michele wen rn (20 1622 : Latrice Holliday, RN)

## 2020-04-20 ENCOUNTER — HOSPITAL ENCOUNTER (INPATIENT)
Facility: HOSPITAL | Age: 30
LOS: 3 days | Discharge: HOME | End: 2020-04-23
Attending: OBSTETRICS & GYNECOLOGY | Admitting: OBSTETRICS & GYNECOLOGY

## 2020-04-20 ENCOUNTER — HOSPITAL ENCOUNTER (OUTPATIENT)
Dept: LABOR AND DELIVERY | Facility: HOSPITAL | Age: 30
Discharge: HOME | End: 2020-04-20

## 2020-04-20 LAB
ABO GROUP BLD: NORMAL
BASOPHILS # BLD AUTO: 0.04 10*3/MM3 (ref 0–0.2)
BASOPHILS NFR BLD AUTO: 0.3 % (ref 0–1.5)
BLD GP AB SCN SERPL QL: POSITIVE
DEPRECATED RDW RBC AUTO: 44.6 FL (ref 37–54)
EOSINOPHIL # BLD AUTO: 0.09 10*3/MM3 (ref 0–0.4)
EOSINOPHIL NFR BLD AUTO: 0.7 % (ref 0.3–6.2)
ERYTHROCYTE [DISTWIDTH] IN BLOOD BY AUTOMATED COUNT: 14.8 % (ref 12.3–15.4)
HCT VFR BLD AUTO: 37.2 % (ref 34–46.6)
HGB BLD-MCNC: 12.1 G/DL (ref 12–15.9)
IMM GRANULOCYTES # BLD AUTO: 0.05 10*3/MM3 (ref 0–0.05)
IMM GRANULOCYTES NFR BLD AUTO: 0.4 % (ref 0–0.5)
LYMPHOCYTES # BLD AUTO: 2.95 10*3/MM3 (ref 0.7–3.1)
LYMPHOCYTES NFR BLD AUTO: 22.2 % (ref 19.6–45.3)
MCH RBC QN AUTO: 27.3 PG (ref 26.6–33)
MCHC RBC AUTO-ENTMCNC: 32.5 G/DL (ref 31.5–35.7)
MCV RBC AUTO: 83.8 FL (ref 79–97)
MONOCYTES # BLD AUTO: 1.07 10*3/MM3 (ref 0.1–0.9)
MONOCYTES NFR BLD AUTO: 8 % (ref 5–12)
NEUTROPHILS # BLD AUTO: 9.1 10*3/MM3 (ref 1.7–7)
NEUTROPHILS NFR BLD AUTO: 68.4 % (ref 42.7–76)
NRBC BLD AUTO-RTO: 0 /100 WBC (ref 0–0.2)
PLATELET # BLD AUTO: 197 10*3/MM3 (ref 140–450)
PMV BLD AUTO: 12.8 FL (ref 6–12)
RBC # BLD AUTO: 4.44 10*6/MM3 (ref 3.77–5.28)
RESIDUAL RHIG DETECTED: NORMAL
RH BLD: NEGATIVE
T&S EXPIRATION DATE: NORMAL
WBC NRBC COR # BLD: 13.3 10*3/MM3 (ref 3.4–10.8)

## 2020-04-20 PROCEDURE — 86900 BLOOD TYPING SEROLOGIC ABO: CPT | Performed by: OBSTETRICS & GYNECOLOGY

## 2020-04-20 PROCEDURE — 25010000003 AMPICILLIN PER 500 MG: Performed by: OBSTETRICS & GYNECOLOGY

## 2020-04-20 PROCEDURE — 59025 FETAL NON-STRESS TEST: CPT

## 2020-04-20 PROCEDURE — 3E0P7VZ INTRODUCTION OF HORMONE INTO FEMALE REPRODUCTIVE, VIA NATURAL OR ARTIFICIAL OPENING: ICD-10-PCS | Performed by: OBSTETRICS & GYNECOLOGY

## 2020-04-20 PROCEDURE — 86901 BLOOD TYPING SEROLOGIC RH(D): CPT | Performed by: OBSTETRICS & GYNECOLOGY

## 2020-04-20 PROCEDURE — 85025 COMPLETE CBC W/AUTO DIFF WBC: CPT | Performed by: OBSTETRICS & GYNECOLOGY

## 2020-04-20 PROCEDURE — 86870 RBC ANTIBODY IDENTIFICATION: CPT | Performed by: OBSTETRICS & GYNECOLOGY

## 2020-04-20 PROCEDURE — 86850 RBC ANTIBODY SCREEN: CPT | Performed by: OBSTETRICS & GYNECOLOGY

## 2020-04-20 RX ORDER — SODIUM CHLORIDE 0.9 % (FLUSH) 0.9 %
3-10 SYRINGE (ML) INJECTION AS NEEDED
Status: DISCONTINUED | OUTPATIENT
Start: 2020-04-20 | End: 2020-04-21 | Stop reason: HOSPADM

## 2020-04-20 RX ORDER — FAMOTIDINE 20 MG/1
10 TABLET, FILM COATED ORAL DAILY
Status: CANCELLED | OUTPATIENT
Start: 2020-04-21

## 2020-04-20 RX ORDER — ACETAMINOPHEN 325 MG/1
650 TABLET ORAL EVERY 4 HOURS PRN
Status: DISCONTINUED | OUTPATIENT
Start: 2020-04-20 | End: 2020-04-21 | Stop reason: HOSPADM

## 2020-04-20 RX ORDER — OXYTOCIN-SODIUM CHLORIDE 0.9% IV SOLN 30 UNIT/500ML 30-0.9/5 UT/ML-%
2-20 SOLUTION INTRAVENOUS
Status: DISCONTINUED | OUTPATIENT
Start: 2020-04-21 | End: 2020-04-21

## 2020-04-20 RX ORDER — MINERAL OIL
OIL (ML) MISCELLANEOUS ONCE
Status: COMPLETED | OUTPATIENT
Start: 2020-04-20 | End: 2020-04-21

## 2020-04-20 RX ORDER — MISOPROSTOL 100 UG/1
25 TABLET ORAL EVERY 4 HOURS PRN
Status: DISCONTINUED | OUTPATIENT
Start: 2020-04-20 | End: 2020-04-21 | Stop reason: HOSPADM

## 2020-04-20 RX ORDER — BUTORPHANOL TARTRATE 1 MG/ML
1 INJECTION, SOLUTION INTRAMUSCULAR; INTRAVENOUS
Status: DISCONTINUED | OUTPATIENT
Start: 2020-04-20 | End: 2020-04-21 | Stop reason: HOSPADM

## 2020-04-20 RX ORDER — CETIRIZINE HYDROCHLORIDE 10 MG/1
10 TABLET ORAL DAILY
Status: CANCELLED | OUTPATIENT
Start: 2020-04-21

## 2020-04-20 RX ORDER — PRENATAL VIT/IRON FUM/FOLIC AC 27MG-0.8MG
1 TABLET ORAL DAILY
Status: CANCELLED | OUTPATIENT
Start: 2020-04-21

## 2020-04-20 RX ORDER — ONDANSETRON 4 MG/1
4 TABLET, FILM COATED ORAL EVERY 6 HOURS PRN
Status: DISCONTINUED | OUTPATIENT
Start: 2020-04-20 | End: 2020-04-21 | Stop reason: HOSPADM

## 2020-04-20 RX ORDER — FAMOTIDINE 10 MG
10 TABLET ORAL 2 TIMES DAILY
COMMUNITY

## 2020-04-20 RX ORDER — TERBUTALINE SULFATE 1 MG/ML
0.25 INJECTION, SOLUTION SUBCUTANEOUS AS NEEDED
Status: DISCONTINUED | OUTPATIENT
Start: 2020-04-20 | End: 2020-04-21 | Stop reason: HOSPADM

## 2020-04-20 RX ORDER — SODIUM CHLORIDE, SODIUM LACTATE, POTASSIUM CHLORIDE, CALCIUM CHLORIDE 600; 310; 30; 20 MG/100ML; MG/100ML; MG/100ML; MG/100ML
125 INJECTION, SOLUTION INTRAVENOUS CONTINUOUS
Status: DISCONTINUED | OUTPATIENT
Start: 2020-04-20 | End: 2020-04-21

## 2020-04-20 RX ORDER — PRENATAL VIT/IRON FUM/FOLIC AC 27MG-0.8MG
1 TABLET ORAL DAILY
COMMUNITY

## 2020-04-20 RX ORDER — MAGNESIUM HYDROXIDE 1200 MG/15ML
1000 LIQUID ORAL ONCE AS NEEDED
Status: DISCONTINUED | OUTPATIENT
Start: 2020-04-20 | End: 2020-04-21

## 2020-04-20 RX ORDER — CETIRIZINE HYDROCHLORIDE 10 MG/1
10 TABLET ORAL DAILY
COMMUNITY

## 2020-04-20 RX ORDER — ONDANSETRON 2 MG/ML
4 INJECTION INTRAMUSCULAR; INTRAVENOUS EVERY 6 HOURS PRN
Status: DISCONTINUED | OUTPATIENT
Start: 2020-04-20 | End: 2020-04-21 | Stop reason: HOSPADM

## 2020-04-20 RX ORDER — ZOLPIDEM TARTRATE 5 MG/1
5 TABLET ORAL NIGHTLY PRN
Status: DISCONTINUED | OUTPATIENT
Start: 2020-04-20 | End: 2020-04-21 | Stop reason: HOSPADM

## 2020-04-20 RX ADMIN — ZOLPIDEM TARTRATE 5 MG: 5 TABLET ORAL at 23:10

## 2020-04-20 RX ADMIN — MISOPROSTOL 25 MCG: 100 TABLET ORAL at 22:55

## 2020-04-20 RX ADMIN — AMPICILLIN SODIUM 2 G: 2 INJECTION, POWDER, FOR SOLUTION INTRAVENOUS at 22:40

## 2020-04-20 RX ADMIN — SODIUM CHLORIDE, POTASSIUM CHLORIDE, SODIUM LACTATE AND CALCIUM CHLORIDE 125 ML/HR: 600; 310; 30; 20 INJECTION, SOLUTION INTRAVENOUS at 22:40

## 2020-04-21 ENCOUNTER — ANESTHESIA (OUTPATIENT)
Dept: LABOR AND DELIVERY | Facility: HOSPITAL | Age: 30
End: 2020-04-21

## 2020-04-21 ENCOUNTER — ANESTHESIA EVENT (OUTPATIENT)
Dept: LABOR AND DELIVERY | Facility: HOSPITAL | Age: 30
End: 2020-04-21

## 2020-04-21 PROBLEM — R03.0 ELEVATED BP WITHOUT DIAGNOSIS OF HYPERTENSION: Status: RESOLVED | Noted: 2020-04-16 | Resolved: 2020-04-21

## 2020-04-21 PROBLEM — Z34.90 PREGNANCY: Status: RESOLVED | Noted: 2020-04-18 | Resolved: 2020-04-21

## 2020-04-21 PROCEDURE — 25010000003 LIDOCAINE 1 % SOLUTION: Performed by: NURSE ANESTHETIST, CERTIFIED REGISTERED

## 2020-04-21 PROCEDURE — 25010000002 AMPICILLIN PER 500 MG: Performed by: OBSTETRICS & GYNECOLOGY

## 2020-04-21 PROCEDURE — 25010000002 BUTORPHANOL PER 1 MG: Performed by: OBSTETRICS & GYNECOLOGY

## 2020-04-21 PROCEDURE — C1755 CATHETER, INTRASPINAL: HCPCS | Performed by: NURSE ANESTHETIST, CERTIFIED REGISTERED

## 2020-04-21 PROCEDURE — C1755 CATHETER, INTRASPINAL: HCPCS

## 2020-04-21 PROCEDURE — 25010000002 TERBUTALINE PER 1 MG: Performed by: OBSTETRICS & GYNECOLOGY

## 2020-04-21 PROCEDURE — 25010000002 FENTANYL CITRATE (PF) 100 MCG/2ML SOLUTION: Performed by: NURSE ANESTHETIST, CERTIFIED REGISTERED

## 2020-04-21 RX ORDER — OXYTOCIN-SODIUM CHLORIDE 0.9% IV SOLN 30 UNIT/500ML 30-0.9/5 UT/ML-%
999 SOLUTION INTRAVENOUS ONCE
Status: DISCONTINUED | OUTPATIENT
Start: 2020-04-21 | End: 2020-04-21 | Stop reason: HOSPADM

## 2020-04-21 RX ORDER — OXYTOCIN-SODIUM CHLORIDE 0.9% IV SOLN 30 UNIT/500ML 30-0.9/5 UT/ML-%
250 SOLUTION INTRAVENOUS CONTINUOUS
Status: ACTIVE | OUTPATIENT
Start: 2020-04-21 | End: 2020-04-21

## 2020-04-21 RX ORDER — LIDOCAINE HYDROCHLORIDE 10 MG/ML
INJECTION, SOLUTION INFILTRATION; PERINEURAL AS NEEDED
Status: DISCONTINUED | OUTPATIENT
Start: 2020-04-21 | End: 2020-04-22 | Stop reason: SURG

## 2020-04-21 RX ORDER — ACETAMINOPHEN 325 MG/1
650 TABLET ORAL EVERY 4 HOURS PRN
Status: DISCONTINUED | OUTPATIENT
Start: 2020-04-21 | End: 2020-04-21 | Stop reason: HOSPADM

## 2020-04-21 RX ORDER — MISOPROSTOL 100 UG/1
600 TABLET ORAL AS NEEDED
Status: DISCONTINUED | OUTPATIENT
Start: 2020-04-21 | End: 2020-04-21 | Stop reason: HOSPADM

## 2020-04-21 RX ORDER — ONDANSETRON 4 MG/1
4 TABLET, FILM COATED ORAL EVERY 8 HOURS PRN
Status: DISCONTINUED | OUTPATIENT
Start: 2020-04-21 | End: 2020-04-23 | Stop reason: HOSPADM

## 2020-04-21 RX ORDER — EPHEDRINE SULFATE 50 MG/ML
10 INJECTION, SOLUTION INTRAVENOUS
Status: DISCONTINUED | OUTPATIENT
Start: 2020-04-21 | End: 2020-04-21 | Stop reason: HOSPADM

## 2020-04-21 RX ORDER — LIDOCAINE HYDROCHLORIDE AND EPINEPHRINE 15; 5 MG/ML; UG/ML
INJECTION, SOLUTION EPIDURAL AS NEEDED
Status: DISCONTINUED | OUTPATIENT
Start: 2020-04-21 | End: 2020-04-22 | Stop reason: SURG

## 2020-04-21 RX ORDER — METHYLERGONOVINE MALEATE 0.2 MG/ML
200 INJECTION INTRAVENOUS ONCE AS NEEDED
Status: DISCONTINUED | OUTPATIENT
Start: 2020-04-21 | End: 2020-04-21 | Stop reason: HOSPADM

## 2020-04-21 RX ORDER — MAGNESIUM HYDROXIDE 1200 MG/15ML
3000 LIQUID ORAL ONCE AS NEEDED
Status: COMPLETED | OUTPATIENT
Start: 2020-04-21 | End: 2020-04-21

## 2020-04-21 RX ORDER — FAMOTIDINE 10 MG/ML
20 INJECTION, SOLUTION INTRAVENOUS ONCE AS NEEDED
Status: DISCONTINUED | OUTPATIENT
Start: 2020-04-21 | End: 2020-04-21 | Stop reason: HOSPADM

## 2020-04-21 RX ORDER — CARBOPROST TROMETHAMINE 250 UG/ML
250 INJECTION, SOLUTION INTRAMUSCULAR AS NEEDED
Status: DISCONTINUED | OUTPATIENT
Start: 2020-04-21 | End: 2020-04-21 | Stop reason: HOSPADM

## 2020-04-21 RX ORDER — ONDANSETRON 2 MG/ML
4 INJECTION INTRAMUSCULAR; INTRAVENOUS ONCE AS NEEDED
Status: DISCONTINUED | OUTPATIENT
Start: 2020-04-21 | End: 2020-04-21 | Stop reason: HOSPADM

## 2020-04-21 RX ORDER — SODIUM CHLORIDE 0.9 % (FLUSH) 0.9 %
1-10 SYRINGE (ML) INJECTION AS NEEDED
Status: DISCONTINUED | OUTPATIENT
Start: 2020-04-21 | End: 2020-04-23 | Stop reason: HOSPADM

## 2020-04-21 RX ORDER — BISACODYL 10 MG
10 SUPPOSITORY, RECTAL RECTAL DAILY PRN
Status: DISCONTINUED | OUTPATIENT
Start: 2020-04-22 | End: 2020-04-23 | Stop reason: HOSPADM

## 2020-04-21 RX ORDER — FENTANYL CIT 0.2 MG/100ML-ROPIV 0.2%-NACL 0.9% EPIDURAL INJ 2/0.2 MCG/ML-%
12 SOLUTION INJECTION CONTINUOUS
Status: DISCONTINUED | OUTPATIENT
Start: 2020-04-21 | End: 2020-04-21

## 2020-04-21 RX ORDER — OXYCODONE HYDROCHLORIDE 5 MG/1
5 TABLET ORAL EVERY 4 HOURS PRN
Status: DISCONTINUED | OUTPATIENT
Start: 2020-04-21 | End: 2020-04-23 | Stop reason: HOSPADM

## 2020-04-21 RX ORDER — HYDROCORTISONE 25 MG/G
1 CREAM TOPICAL AS NEEDED
Status: DISCONTINUED | OUTPATIENT
Start: 2020-04-21 | End: 2020-04-23 | Stop reason: HOSPADM

## 2020-04-21 RX ORDER — IBUPROFEN 800 MG/1
800 TABLET ORAL EVERY 6 HOURS PRN
Status: DISCONTINUED | OUTPATIENT
Start: 2020-04-21 | End: 2020-04-21 | Stop reason: HOSPADM

## 2020-04-21 RX ORDER — HYDROCORTISONE ACETATE PRAMOXINE HCL 1; 1 G/100G; G/100G
1 CREAM TOPICAL AS NEEDED
Status: DISCONTINUED | OUTPATIENT
Start: 2020-04-21 | End: 2020-04-23 | Stop reason: HOSPADM

## 2020-04-21 RX ORDER — IBUPROFEN 800 MG/1
800 TABLET ORAL 3 TIMES DAILY
Status: DISCONTINUED | OUTPATIENT
Start: 2020-04-21 | End: 2020-04-23 | Stop reason: HOSPADM

## 2020-04-21 RX ORDER — SODIUM CHLORIDE, SODIUM LACTATE, POTASSIUM CHLORIDE, CALCIUM CHLORIDE 600; 310; 30; 20 MG/100ML; MG/100ML; MG/100ML; MG/100ML
125 INJECTION, SOLUTION INTRAVENOUS CONTINUOUS
Status: DISCONTINUED | OUTPATIENT
Start: 2020-04-21 | End: 2020-04-23 | Stop reason: HOSPADM

## 2020-04-21 RX ORDER — GLYCERIN/PROPYLENE GLYCOL/WATR
1 SOLUTION, NON-ORAL VAGINAL AS NEEDED
Status: DISCONTINUED | OUTPATIENT
Start: 2020-04-21 | End: 2020-04-23 | Stop reason: HOSPADM

## 2020-04-21 RX ORDER — OXYTOCIN-SODIUM CHLORIDE 0.9% IV SOLN 30 UNIT/500ML 30-0.9/5 UT/ML-%
125 SOLUTION INTRAVENOUS CONTINUOUS PRN
Status: DISCONTINUED | OUTPATIENT
Start: 2020-04-21 | End: 2020-04-21 | Stop reason: HOSPADM

## 2020-04-21 RX ORDER — FENTANYL CITRATE 50 UG/ML
100 INJECTION, SOLUTION INTRAMUSCULAR; INTRAVENOUS ONCE
Status: COMPLETED | OUTPATIENT
Start: 2020-04-21 | End: 2020-04-21

## 2020-04-21 RX ADMIN — BUTORPHANOL TARTRATE 2 MG: 2 INJECTION, SOLUTION INTRAMUSCULAR; INTRAVENOUS at 08:32

## 2020-04-21 RX ADMIN — Medication: at 07:48

## 2020-04-21 RX ADMIN — WITCH HAZEL 1 PAD: 500 SOLUTION RECTAL; TOPICAL at 17:49

## 2020-04-21 RX ADMIN — SODIUM CHLORIDE, POTASSIUM CHLORIDE, SODIUM LACTATE AND CALCIUM CHLORIDE 999 ML/HR: 600; 310; 30; 20 INJECTION, SOLUTION INTRAVENOUS at 11:39

## 2020-04-21 RX ADMIN — EPHEDRINE SULFATE 10 MG: 50 INJECTION INTRAVENOUS at 12:00

## 2020-04-21 RX ADMIN — TERBUTALINE SULFATE 0.25 MG: 1 INJECTION SUBCUTANEOUS at 12:07

## 2020-04-21 RX ADMIN — LIDOCAINE HYDROCHLORIDE 3 ML: 10 INJECTION, SOLUTION INFILTRATION; PERINEURAL at 10:20

## 2020-04-21 RX ADMIN — SODIUM CHLORIDE 3000 ML: 900 IRRIGANT IRRIGATION at 07:48

## 2020-04-21 RX ADMIN — SODIUM CHLORIDE, POTASSIUM CHLORIDE, SODIUM LACTATE AND CALCIUM CHLORIDE 125 ML/HR: 600; 310; 30; 20 INJECTION, SOLUTION INTRAVENOUS at 06:17

## 2020-04-21 RX ADMIN — LIDOCAINE HYDROCHLORIDE AND EPINEPHRINE 3 ML: 15; 5 INJECTION, SOLUTION EPIDURAL at 10:25

## 2020-04-21 RX ADMIN — EPHEDRINE SULFATE 10 MG: 50 INJECTION INTRAVENOUS at 11:37

## 2020-04-21 RX ADMIN — Medication 1 APPLICATION: at 07:48

## 2020-04-21 RX ADMIN — FENTANYL CIT 0.2 MG/100ML-ROPIV 0.2%-NACL 0.9% EPIDURAL INJ 12 ML/HR: 2/0.2 SOLUTION at 10:28

## 2020-04-21 RX ADMIN — IBUPROFEN 800 MG: 800 TABLET, FILM COATED ORAL at 13:38

## 2020-04-21 RX ADMIN — SODIUM CHLORIDE, POTASSIUM CHLORIDE, SODIUM LACTATE AND CALCIUM CHLORIDE 999 ML/HR: 600; 310; 30; 20 INJECTION, SOLUTION INTRAVENOUS at 10:10

## 2020-04-21 RX ADMIN — MISOPROSTOL 600 MCG: 100 TABLET ORAL at 13:51

## 2020-04-21 RX ADMIN — MISOPROSTOL 25 MCG: 100 TABLET ORAL at 08:16

## 2020-04-21 RX ADMIN — Medication: at 17:49

## 2020-04-21 RX ADMIN — MISOPROSTOL 25 MCG: 100 TABLET ORAL at 02:55

## 2020-04-21 RX ADMIN — AMPICILLIN SODIUM 1 G: 1 INJECTION, POWDER, FOR SOLUTION INTRAMUSCULAR; INTRAVENOUS at 09:16

## 2020-04-21 RX ADMIN — AMPICILLIN SODIUM 1 G: 1 INJECTION, POWDER, FOR SOLUTION INTRAMUSCULAR; INTRAVENOUS at 06:18

## 2020-04-21 RX ADMIN — AMPICILLIN SODIUM 1 G: 1 INJECTION, POWDER, FOR SOLUTION INTRAMUSCULAR; INTRAVENOUS at 02:55

## 2020-04-21 RX ADMIN — IBUPROFEN 800 MG: 800 TABLET, FILM COATED ORAL at 21:16

## 2020-04-21 RX ADMIN — FENTANYL CITRATE 100 MCG: 50 INJECTION, SOLUTION INTRAMUSCULAR; INTRAVENOUS at 10:28

## 2020-04-21 NOTE — ANESTHESIA PREPROCEDURE EVALUATION
Anesthesia Evaluation     Patient summary reviewed and Nursing notes reviewed   no history of anesthetic complications:               Airway   Mallampati: I  TM distance: >3 FB  Neck ROM: full  No difficulty expected  Dental - normal exam     Pulmonary - negative pulmonary ROS and normal exam   Cardiovascular - negative cardio ROS and normal exam        Neuro/Psych- negative ROS  GI/Hepatic/Renal/Endo - negative ROS     Musculoskeletal (-) negative ROS    Abdominal  - normal exam    Bowel sounds: normal.   Substance History - negative use     OB/GYN    (+) Pregnant,         Other                        Anesthesia Plan    ASA 2     epidural       Anesthetic plan, all risks, benefits, and alternatives have been provided, discussed and informed consent has been obtained with: patient.

## 2020-04-21 NOTE — ANESTHESIA PROCEDURE NOTES
Labor Epidural      Patient location during procedure: OB  Start Time: 4/21/2020 10:19 AM  Stop Time: 4/21/2020 10:25 AM  Indication:at surgeon's request  Performed By  CRNA: Quinten Osborn CRNA  Preanesthetic Checklist  Completed: patient identified, site marked, surgical consent, pre-op evaluation, timeout performed, IV checked, risks and benefits discussed and monitors and equipment checked  Prep:  Pt Position:sitting  Sterile Tech:cap, gloves, mask and sterile barrier  Prep:povidone-iodine 7.5% surgical scrub  Monitoring:blood pressure monitoring and continuous pulse oximetry  Epidural Block Procedure:  Approach:midline  Guidance:landmark technique  Location:L3-L4  Needle Type:Tuohy  Needle Gauge:18 G  Loss of Resistance Medium: saline  Loss of Resistance: 7cm  Cath Depth at skin:13 cm  Paresthesia: none  Aspiration:negative  Test Dose:negative  Number of Attempts: 1  Post Assessment:  Dressing:secured with tape and occlusive dressing applied  Pt Tolerance:patient tolerated the procedure well with no apparent complications  Complications:no

## 2020-04-21 NOTE — INTERVAL H&P NOTE
H&P updated. The patient was examined and recieved cytotec times 3 doses for postdates.  She is 4/90/-2 today.

## 2020-04-21 NOTE — PLAN OF CARE
IV HEP-LOCKED WAS AND NO REDNESS NOTED TO SITE.  EPIDURAL WAS REMOVED WITH BLUE TIP INTACT.  PERICARE PROVIDED AND PADS CHANGED.  NON-SKID SOCKS TO FEET.  PT TO [facility] PER  WC WITH FOB AND BELONGINGS WITH PT.

## 2020-04-21 NOTE — L&D DELIVERY NOTE
Kranthi  Vaginal Delivery Note    Delivery     Delivery: Vaginal, Spontaneous     YOB: 2020    Time of Birth: 1:11 PM      Anesthesia: Epidural     Delivering clinician: Claudine Bush    Forceps?   No   Vacuum? No    Shoulder dystocia present: No        Delivery narrative:              Left mediolateral episiotomy cut to help effect delivery. Pt placed in Radha position and pushed to then deliver in the RAJEEV position. Anterior shoulder delivered atraumatically, followed by posterior shoulder.  Infant was placed to mother's chest.   Mouth and nares were bulb suctioned.  Cord was doubly clamped and cut.  Cord blood was obtained.  Placenta was delivered spontaneously.  Uterus was firm with fundal massage.        Infant    Findings: female  infant     Infant observations: Weight: No birth weight on file.   Length:    in  Observations/Comments:         Apgars: 8   @ 1 minute /    9   @ 5 minutes   Infant Name:      Placenta, Cord, and Fluid    Placenta delivered  Spontaneous  at  4/21/2020  1:15 PM     Cord: 3 vessels  present.   Nuchal Cord?  no   Cord blood obtained: Yes                   Repair    Episiotomy: Not recorded    Lacerations: Yes  Laceration Information  Laceration Repaired?   Perineal:         Periurethral:         Labial:         Sulcus:         Vaginal:   yes   yes   Cervical:           Suture used for repair: 2-0 chromic gut   Estimated Blood Loss:   350 mls.   Suture used for repair:      Complications  none    Disposition  Mother to postpartum in stable condition.    Claudine Bush DO  04/21/20  14:28

## 2020-04-22 LAB
ABO GROUP BLD: NORMAL
FETAL BLEED: NEGATIVE
HCT VFR BLD AUTO: 26.9 % (ref 34–46.6)
HGB BLD-MCNC: 8.6 G/DL (ref 12–15.9)
NUMBER OF DOSES: NORMAL
RH BLD: NEGATIVE

## 2020-04-22 PROCEDURE — 85461 HEMOGLOBIN FETAL: CPT | Performed by: OBSTETRICS & GYNECOLOGY

## 2020-04-22 PROCEDURE — 85018 HEMOGLOBIN: CPT | Performed by: OBSTETRICS & GYNECOLOGY

## 2020-04-22 PROCEDURE — 86900 BLOOD TYPING SEROLOGIC ABO: CPT | Performed by: OBSTETRICS & GYNECOLOGY

## 2020-04-22 PROCEDURE — 86901 BLOOD TYPING SEROLOGIC RH(D): CPT | Performed by: OBSTETRICS & GYNECOLOGY

## 2020-04-22 PROCEDURE — 85014 HEMATOCRIT: CPT | Performed by: OBSTETRICS & GYNECOLOGY

## 2020-04-22 PROCEDURE — 25010000003 RHO D IMMUNE GLOBULIN 1500 UNITS SOLUTION PREFILLED SYRINGE: Performed by: OBSTETRICS & GYNECOLOGY

## 2020-04-22 RX ORDER — CETIRIZINE HYDROCHLORIDE 10 MG/1
10 TABLET ORAL DAILY
Status: DISCONTINUED | OUTPATIENT
Start: 2020-04-22 | End: 2020-04-23 | Stop reason: HOSPADM

## 2020-04-22 RX ORDER — FAMOTIDINE 20 MG/1
10 TABLET, FILM COATED ORAL 2 TIMES DAILY PRN
Status: DISCONTINUED | OUTPATIENT
Start: 2020-04-22 | End: 2020-04-23 | Stop reason: HOSPADM

## 2020-04-22 RX ORDER — LANOLIN 100 %
OINTMENT (GRAM) TOPICAL AS NEEDED
Status: DISCONTINUED | OUTPATIENT
Start: 2020-04-22 | End: 2020-04-23 | Stop reason: HOSPADM

## 2020-04-22 RX ORDER — PRENATAL VIT/IRON FUM/FOLIC AC 27MG-0.8MG
1 TABLET ORAL DAILY
Status: DISCONTINUED | OUTPATIENT
Start: 2020-04-22 | End: 2020-04-23 | Stop reason: HOSPADM

## 2020-04-22 RX ORDER — POLYETHYLENE GLYCOL 3350 17 G/17G
17 POWDER, FOR SOLUTION ORAL DAILY
Status: DISCONTINUED | OUTPATIENT
Start: 2020-04-22 | End: 2020-04-23 | Stop reason: HOSPADM

## 2020-04-22 RX ADMIN — HUMAN RHO(D) IMMUNE GLOBULIN 1500 UNITS: 300 INJECTION, SOLUTION INTRAMUSCULAR at 15:38

## 2020-04-22 RX ADMIN — Medication: at 06:04

## 2020-04-22 RX ADMIN — IBUPROFEN 800 MG: 800 TABLET, FILM COATED ORAL at 15:38

## 2020-04-22 RX ADMIN — POLYETHYLENE GLYCOL 3350 17 G: 17 POWDER, FOR SOLUTION ORAL at 13:47

## 2020-04-22 RX ADMIN — IBUPROFEN 800 MG: 800 TABLET, FILM COATED ORAL at 08:07

## 2020-04-22 RX ADMIN — PRENATAL VIT W/ FE FUMARATE-FA TAB 27-0.8 MG 1 TABLET: 27-0.8 TAB at 13:47

## 2020-04-22 RX ADMIN — CETIRIZINE HYDROCHLORIDE 10 MG: 10 TABLET, FILM COATED ORAL at 13:47

## 2020-04-22 NOTE — PROGRESS NOTES
" Kranthi  Vaginal Delivery Progress Note    Subjective   Subjective  Postpartum Day 1: Vaginal Delivery    The patient feels well.  Her pain is well controlled with nonsteroidal anti-inflammatory drugs.   She is ambulating well.  Patient describes her bleeding as moderate lochia.    Breastfeeding: infant latching.    Objective     Objective   Vital Signs Range for the last 24 hours  Temperature: Temp:  [97.3 °F (36.3 °C)-98.2 °F (36.8 °C)] 97.9 °F (36.6 °C)   Temp Source: Temp src: Oral   BP: BP: (111-149)/(58-85) 126/79   Pulse: Heart Rate:  [] 113   Respirations: Resp:  [18-20] 18   Weight:       Admit Height:  Height: 160 cm (63\")    Physical Exam:  General:  no acute distresss.  Abdomen: Fundus: appropriate, firm, non tender  Extremities: normal, atraumatic, no cyanosis, and trace edema.     [unfilled]       Lab Results   Component Value Date    ABO O 04/22/2020    RH Negative 04/22/2020        Lab Results   Component Value Date    HGB 8.6 (L) 04/22/2020    HCT 26.9 (L) 04/22/2020         Assessment/Plan   Active Problems:    Postpartum care following vaginal delivery      Jolly Velazquez is Day 1  post-partum  Vaginal, Spontaneous    .      Plan:  Continue current care.      Kathleen Castillo,   4/22/2020  12:10    "

## 2020-04-22 NOTE — PLAN OF CARE
Pt's vitals wnl, small amount of bleeding, no complaints at this time  Problem: Patient Care Overview  Goal: Plan of Care Review  Outcome: Ongoing (interventions implemented as appropriate)  Flowsheets  Taken 4/21/2020 1540 by Prema Raya RN  Progress: improving  Taken 4/21/2020 2000 by Estela Noel RN  Plan of Care Reviewed With: patient  Goal: Discharge Needs Assessment  Outcome: Ongoing (interventions implemented as appropriate)  Flowsheets  Taken 4/22/2020 0304 by Estela Noel RN  Equipment Needed After Discharge: none  Anticipated Changes Related to Illness: none  Concerns to be Addressed: no discharge needs identified  Readmission Within the Last 30 Days: no previous admission in last 30 days  Taken 4/20/2020 2302 by Minerva Harper RN  Equipment Currently Used at Home: none  Transportation Anticipated: family or friend will provide  Transportation Concerns: car, none  Patient/Family Anticipated Services at Transition: none  Patient/Family Anticipates Transition to: home     Problem: Breastfeeding (Adult,Obstetrics,Pediatric)  Goal: Signs and Symptoms of Listed Potential Problems Will be Absent, Minimized or Managed (Breastfeeding)  Outcome: Ongoing (interventions implemented as appropriate)  Flowsheets (Taken 4/22/2020 0304)  Problems Assessed (Breastfeeding): all  Problems Present (Breastfeeding): none     Problem: Postpartum (Vaginal Delivery) (Adult,Obstetrics,Pediatric)  Goal: Signs and Symptoms of Listed Potential Problems Will be Absent, Minimized or Managed (Postpartum)  Outcome: Ongoing (interventions implemented as appropriate)  Flowsheets (Taken 4/22/2020 0304)  Problems Assessed (Postpartum Vaginal Delivery): all  Problems Present (Postpartum Vag Deliv): none

## 2020-04-22 NOTE — LACTATION NOTE
This note was copied from a baby's chart.  Mother states breastfeeding is going better now than earlier this morning. Encouraged to ask for help if needed.

## 2020-04-23 VITALS
SYSTOLIC BLOOD PRESSURE: 137 MMHG | RESPIRATION RATE: 18 BRPM | HEIGHT: 63 IN | HEART RATE: 97 BPM | TEMPERATURE: 97.4 F | OXYGEN SATURATION: 97 % | WEIGHT: 243 LBS | DIASTOLIC BLOOD PRESSURE: 83 MMHG | BODY MASS INDEX: 43.05 KG/M2

## 2020-04-23 RX ORDER — IBUPROFEN 600 MG/1
600 TABLET ORAL EVERY 6 HOURS PRN
Qty: 30 TABLET | Refills: 0 | Status: SHIPPED | OUTPATIENT
Start: 2020-04-23 | End: 2020-05-23

## 2020-04-23 RX ADMIN — IBUPROFEN 800 MG: 800 TABLET, FILM COATED ORAL at 08:36

## 2020-04-23 RX ADMIN — PRENATAL VIT W/ FE FUMARATE-FA TAB 27-0.8 MG 1 TABLET: 27-0.8 TAB at 08:36

## 2020-04-23 RX ADMIN — CETIRIZINE HYDROCHLORIDE 10 MG: 10 TABLET, FILM COATED ORAL at 08:36

## 2020-04-23 NOTE — PLAN OF CARE
Problem: Patient Care Overview  Goal: Plan of Care Review  Outcome: Ongoing (interventions implemented as appropriate)  Flowsheets (Taken 4/23/2020 0605)  Progress: improving  Plan of Care Reviewed With: patient  Note:   Progressing toward goals.      Problem: Postpartum (Vaginal Delivery) (Adult,Obstetrics,Pediatric)  Goal: Signs and Symptoms of Listed Potential Problems Will be Absent, Minimized or Managed (Postpartum)  Outcome: Ongoing (interventions implemented as appropriate)

## 2020-04-23 NOTE — PLAN OF CARE
Problem: Patient Care Overview  Goal: Plan of Care Review  Outcome: Outcome(s) achieved  Flowsheets  Taken 4/23/2020 0605 by Harsh Garcia, RN  Progress: improving  Taken 4/23/2020 0836 by Maribell Garcia, RN  Plan of Care Reviewed With: patient  Note:   Patient is doing well, firm, small lochia, being discharge home without complications.   Goal: Individualization and Mutuality  Outcome: Outcome(s) achieved  Goal: Discharge Needs Assessment  Outcome: Outcome(s) achieved  Goal: Interprofessional Rounds/Family Conf  Outcome: Outcome(s) achieved     Problem: Postpartum (Vaginal Delivery) (Adult,Obstetrics,Pediatric)  Goal: Signs and Symptoms of Listed Potential Problems Will be Absent, Minimized or Managed (Postpartum)  Outcome: Outcome(s) achieved

## 2020-04-23 NOTE — PROGRESS NOTES
" Kranthi  Vaginal Delivery Progress Note    Subjective   Postpartum Day 2: Vaginal Delivery    The patient feels well.  Denies complaints.  Lochia is light.      Objective     Vital Signs Range for the last 24 hours  Temperature: Temp:  [97.4 °F (36.3 °C)-97.9 °F (36.6 °C)] 97.4 °F (36.3 °C)   Temp Source: Temp src: Temporal   BP: BP: (121-137)/(77-83) 137/83   Pulse: Heart Rate:  [90-97] 97   Respirations: Resp:  [18] 18   SPO2: SpO2:  [97 %] 97 %   O2 Amount (l/min):     O2 Devices Device (Oxygen Therapy): room air   Weight:       Admit Height:  Height: 160 cm (63\")      Physical Exam:  General:  no acute distresss.  Abdomen: Fundus: appropriate, firm, non tender  Extremities: normal, atraumatic, no cyanosis, and trace edema.       Lab results reviewed:  Yes       Assessment/Plan     Normal postpartum state      Plan:  Discharge home with standard precautions and return to clinic in 4-6 weeks.      Shiv Turner DO  4/23/2020  10:55  "

## 2020-04-23 NOTE — DISCHARGE SUMMARY
DAY Crisostomo  Delivery Discharge Summary    Primary OB Clinician: Shiv Turner DO    EDC: Estimated Date of Delivery: 20    Gestational Age:40w2d    Antepartum complications: none    Date of Delivery: 2020   Time of Delivery: 1:11 PM     Delivered By:  Claudine Bush     Delivery Type: Vaginal, Spontaneous      Tubal Ligation: n/a    Baby:female  infant;   Apgar:  8   @ 1 minute /   Apgar:  9   @ 5 minutes   Weight: 3264 g (7 lb 3.1 oz)    Length: 19.882     Anesthesia: Epidural      Intrapartum complications: None    Laceration: Yes  Laceration Information  Laceration Repaired?   Perineal: None       Periurethral:         Labial:         Sulcus:         Vaginal:         Cervical:           Suture used for repair: 2-0 chromic gut    Episiotomy: No    Placenta: Spontaneous     Feeding method: Breastfeeding Status: Yes    Discharge Date: 2020; Discharge Time: 10:56    Plan:    Follow-up appointment with primary OB/GYN in 2 weeks.

## 2020-09-15 ENCOUNTER — TRANSCRIBE ORDERS (OUTPATIENT)
Dept: ADMINISTRATIVE | Facility: HOSPITAL | Age: 30
End: 2020-09-15

## 2020-09-15 DIAGNOSIS — E66.9 OBESITY, UNSPECIFIED CLASSIFICATION, UNSPECIFIED OBESITY TYPE, UNSPECIFIED WHETHER SERIOUS COMORBIDITY PRESENT: Primary | ICD-10-CM

## 2024-05-10 ENCOUNTER — LAB (OUTPATIENT)
Dept: LAB | Facility: HOSPITAL | Age: 34
End: 2024-05-10
Payer: COMMERCIAL

## 2024-05-10 ENCOUNTER — TRANSCRIBE ORDERS (OUTPATIENT)
Dept: PREADMISSION TESTING | Facility: HOSPITAL | Age: 34
End: 2024-05-10
Payer: COMMERCIAL

## 2024-05-10 DIAGNOSIS — R79.89 HYPOURICEMIA: Primary | ICD-10-CM

## 2024-05-10 DIAGNOSIS — I73.00 SECONDARY RAYNAUD'S PHENOMENON: ICD-10-CM

## 2024-05-10 DIAGNOSIS — N48.1 BALANITIS: ICD-10-CM

## 2024-05-10 DIAGNOSIS — R53.83 TIREDNESS: ICD-10-CM

## 2024-05-10 DIAGNOSIS — D82.4 RECURRING COLD STAPHYLOCOCCAL ABSCESSES: ICD-10-CM

## 2024-05-10 DIAGNOSIS — E78.00 PURE HYPERCHOLESTEROLEMIA: ICD-10-CM

## 2024-05-10 DIAGNOSIS — R79.89 HYPOURICEMIA: ICD-10-CM

## 2024-05-10 LAB
25(OH)D3 SERPL-MCNC: 45.8 NG/ML (ref 30–100)
ALBUMIN SERPL-MCNC: 4.3 G/DL (ref 3.5–5.2)
ALBUMIN/GLOB SERPL: 1.7 G/DL
ALP SERPL-CCNC: 69 U/L (ref 39–117)
ALT SERPL W P-5'-P-CCNC: 11 U/L (ref 1–33)
ANION GAP SERPL CALCULATED.3IONS-SCNC: 10 MMOL/L (ref 5–15)
AST SERPL-CCNC: 14 U/L (ref 1–32)
BILIRUB SERPL-MCNC: 0.3 MG/DL (ref 0–1.2)
BUN SERPL-MCNC: 13 MG/DL (ref 6–20)
BUN/CREAT SERPL: 15.5 (ref 7–25)
CALCIUM SPEC-SCNC: 9.2 MG/DL (ref 8.6–10.5)
CHLORIDE SERPL-SCNC: 103 MMOL/L (ref 98–107)
CHOLEST SERPL-MCNC: 166 MG/DL (ref 0–200)
CO2 SERPL-SCNC: 24 MMOL/L (ref 22–29)
CREAT SERPL-MCNC: 0.84 MG/DL (ref 0.57–1)
EGFRCR SERPLBLD CKD-EPI 2021: 93.7 ML/MIN/1.73
GLOBULIN UR ELPH-MCNC: 2.6 GM/DL
GLUCOSE SERPL-MCNC: 76 MG/DL (ref 65–99)
HBA1C MFR BLD: 5.3 % (ref 4.8–5.6)
HDLC SERPL-MCNC: 40 MG/DL (ref 40–60)
LDLC SERPL CALC-MCNC: 112 MG/DL (ref 0–100)
LDLC/HDLC SERPL: 2.8 {RATIO}
POTASSIUM SERPL-SCNC: 4.3 MMOL/L (ref 3.5–5.2)
PROT SERPL-MCNC: 6.9 G/DL (ref 6–8.5)
SODIUM SERPL-SCNC: 137 MMOL/L (ref 136–145)
T3 SERPL-MCNC: 114 NG/DL (ref 80–200)
T3FREE SERPL-MCNC: 3.46 PG/ML (ref 2–4.4)
T4 FREE SERPL-MCNC: 1.42 NG/DL (ref 0.93–1.7)
T4 SERPL-MCNC: 7.63 MCG/DL (ref 4.5–11.7)
TRIGL SERPL-MCNC: 71 MG/DL (ref 0–150)
TSH SERPL DL<=0.05 MIU/L-ACNC: 0.61 UIU/ML (ref 0.27–4.2)
VIT B12 BLD-MCNC: 703 PG/ML (ref 211–946)
VLDLC SERPL-MCNC: 14 MG/DL (ref 5–40)

## 2024-05-10 PROCEDURE — 80061 LIPID PANEL: CPT

## 2024-05-10 PROCEDURE — 82306 VITAMIN D 25 HYDROXY: CPT

## 2024-05-10 PROCEDURE — 82607 VITAMIN B-12: CPT

## 2024-05-10 PROCEDURE — 80053 COMPREHEN METABOLIC PANEL: CPT

## 2024-05-10 PROCEDURE — 84439 ASSAY OF FREE THYROXINE: CPT

## 2024-05-10 PROCEDURE — 84443 ASSAY THYROID STIM HORMONE: CPT

## 2024-05-10 PROCEDURE — 84481 FREE ASSAY (FT-3): CPT

## 2024-05-10 PROCEDURE — 82785 ASSAY OF IGE: CPT

## 2024-05-10 PROCEDURE — 83036 HEMOGLOBIN GLYCOSYLATED A1C: CPT

## 2024-05-10 PROCEDURE — 84436 ASSAY OF TOTAL THYROXINE: CPT

## 2024-05-10 PROCEDURE — 36415 COLL VENOUS BLD VENIPUNCTURE: CPT

## 2024-05-16 LAB — IGE SERPL-ACNC: 95 IU/ML (ref 6–495)

## 2025-02-25 ENCOUNTER — OFFICE VISIT (OUTPATIENT)
Dept: ORTHOPEDIC SURGERY | Facility: CLINIC | Age: 35
End: 2025-02-25
Payer: COMMERCIAL

## 2025-02-25 VITALS
HEART RATE: 63 BPM | SYSTOLIC BLOOD PRESSURE: 118 MMHG | BODY MASS INDEX: 31.15 KG/M2 | WEIGHT: 175.8 LBS | OXYGEN SATURATION: 96 % | HEIGHT: 63 IN | DIASTOLIC BLOOD PRESSURE: 80 MMHG

## 2025-02-25 DIAGNOSIS — M79.10 MUSCLE PAIN: Primary | ICD-10-CM

## 2025-02-25 DIAGNOSIS — R10.12 LUQ ABDOMINAL PAIN: ICD-10-CM

## 2025-02-25 DIAGNOSIS — R06.6 SPASM OF DIAPHRAGM: ICD-10-CM

## 2025-02-25 DIAGNOSIS — M62.830 LUMBAR PARASPINAL MUSCLE SPASM: ICD-10-CM

## 2025-02-25 DIAGNOSIS — M62.838 MUSCLE SPASM: ICD-10-CM

## 2025-02-25 NOTE — PROGRESS NOTES
New Patient Visit      Patient: Jolly Velazquez  YOB: 1990  Date of Encounter: 02/25/2025  PCP: Rivka Mendez APRN  Referring Provider: No ref. provider found     Subjective   Jolly Velazquez is a 35 y.o. female who presents to the office today for evaluation of No chief complaint on file.      No chief complaint on file.      History of Present Illness  The patient presents for evaluation of muscle spasms.    She was referred by Dr. Acevedo due to persistent muscle spasms in the left upper quadrant, specifically under the ribs. She describes the sensation as a pinching or shifting movement, which is not visible or palpable externally. The pain is severe enough to necessitate cessation of her activities until it subsides. The episodes last between 30 seconds to a minute and have been absent for the past 1 to 2 weeks. She reports no gastrointestinal symptoms such as stomach upset, bleeding, vomiting, or diarrhea. Additionally, she does not experience any respiratory symptoms including shortness of breath, cough, or chest pain. 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 She has not attempted to manage the pain with ibuprofen. Her current medication regimen includes Prozac. She does not take multivitamins and primarily consumes tea and water throughout the day. She has a 4-year-old child and has been engaging in core workouts, which seem to exacerbate the pain. She notes that certain movements trigger the discomfort, which can be alleviated by adjusting her position. She attempted to alleviate the symptoms with a prebiotic and probiotic supplement, but this seemed to exacerbate the condition.    MEDICATIONS  Prozac    Patient Active Problem List   Diagnosis    Postpartum care following vaginal delivery       Past Medical History:   Diagnosis Date    Chlamydia        Past Surgical History:   Procedure Laterality Date    LEG SURGERY    "      Family History   Problem Relation Age of Onset    Cancer Maternal Grandmother     Hypertension Father     Hypertension Mother     Hypertension Brother     Diabetes Sister     Breast cancer Paternal Grandmother        Social History     Socioeconomic History    Marital status:    Tobacco Use    Smoking status: Never    Smokeless tobacco: Never   Vaping Use    Vaping status: Never Used   Substance and Sexual Activity    Alcohol use: Not Currently    Drug use: Never    Sexual activity: Defer       Current Outpatient Medications   Medication Sig Dispense Refill    cetirizine (zyrTEC) 10 MG tablet Take 1 tablet by mouth Daily.      FLUoxetine (PROzac) 20 MG capsule Take 1 capsule by mouth Daily.      famotidine (PEPCID) 10 MG tablet Take 10 mg by mouth 2 (Two) Times a Day. (Patient not taking: Reported on 2/25/2025)      Prenatal Vit-Fe Fumarate-FA (PRENATAL VITAMIN 27-0.8) 27-0.8 MG tablet tablet Take 1 tablet by mouth Daily. (Patient not taking: Reported on 2/25/2025)       No current facility-administered medications for this visit.       No Active Allergies    Vitals:   /80 (BP Location: Right arm, Patient Position: Sitting, Cuff Size: Adult)   Pulse 63   Ht 160 cm (63\")   Wt 79.7 kg (175 lb 12.8 oz)   SpO2 96%   Breastfeeding No   BMI 31.14 kg/m²          Visit Vitals  /80 (BP Location: Right arm, Patient Position: Sitting, Cuff Size: Adult)   Pulse 63   Ht 160 cm (63\")   Wt 79.7 kg (175 lb 12.8 oz)   SpO2 96%   Breastfeeding No   BMI 31.14 kg/m²     35 y.o.female     Review of Systems    Physical Exam  Vitals and nursing note reviewed.   Constitutional:       General: She is not in acute distress.     Appearance: Normal appearance.   Cardiovascular:      Rate and Rhythm: Normal rate and regular rhythm.      Heart sounds: Normal heart sounds. No murmur heard.     No friction rub. No gallop.   Pulmonary:      Effort: Pulmonary effort is normal. No respiratory distress.      Breath " sounds: Normal breath sounds and air entry. No stridor or decreased air movement. No decreased breath sounds, wheezing, rhonchi or rales.   Abdominal:      General: Abdomen is flat. There is no distension.      Tenderness: There is abdominal tenderness. There is no right CVA tenderness, left CVA tenderness, guarding or rebound.          Comments: Tenderness under the rib cage in the left upper quadrant up to the xiphoid  No palpable masses.  Appropriate rib movement on respiration   Musculoskeletal:      Thoracic back: Spasms and tenderness present. No bony tenderness. Decreased range of motion.      Lumbar back: Spasms and tenderness present. No bony tenderness. Decreased range of motion.        Back:       Comments: Spasm of the right lumbar paraspinals on the left with soreness   Skin:     General: Skin is warm and dry.      Findings: No erythema.   Neurological:      General: No focal deficit present.      Mental Status: She is alert.      Sensory: No sensory deficit.      Motor: No weakness.       Physical Exam  Lungs were auscultated.  Heart was examined.  There is some soreness along the paraspinal musculature and some spasm down there in the low back on the left side. The patient is a little sore up underneath the rib cage.    Radiology Results:    No image results found.      Results       Diagnoses and all orders for this visit:    1. Muscle pain (Primary)    2. Muscle spasm    3. Spasm of diaphragm    4. Lumbar paraspinal muscle spasm    5. LUQ abdominal pain        Assessment & Plan  1. Muscle spasms.  Patient seems to be experiencing spasms of the diaphragm and rib intercostal muscles on the left.  She has some mild soreness in those areas today in the office.  She describes sharp severe spasm-like episodes that only occur when she gets in a certain awkward position and resolve quickly.  States it was exacerbated by doing abdominal crunches.  Recommend patient pursue an abdominal workout that does not  involve crunches and have given her home exercise program for the thoracolumbar spine on handouts.  May benefit from staying well-hydrated and drinking an electrolyte drink daily, taking vitamins or soaking in the warm tub with Epsom salts.  Would likely benefit from osteopathic evaluation and management and will follow-up for this.  If symptoms persist or worsen would consider abdominal workup.        Discussion:    MEDS ORDERED DURING VISIT:  No orders of the defined types were placed in this encounter.    MEDICATION ISSUES:  Discussed medication options and treatment plan of prescribed medication as well as the risks, benefits, and side effects including potential falls, possible impaired driving and metabolic adversities among others. Patient is agreeable to call the office with any worsening of symptoms or onset of side effects. Patient is agreeable to call 911 or go to the nearest ER should he/she begin having SI/HI.         This document has been electronically signed by Tigre Parisi DO   February 25, 2025 09:19 EST    Patient or patient representative verbalized consent for the use of Ambient Listening during the visit with  Tigre Parisi DO for chart documentation. 2/25/2025  09:54 EST

## 2025-02-26 ENCOUNTER — PATIENT ROUNDING (BHMG ONLY) (OUTPATIENT)
Dept: ORTHOPEDIC SURGERY | Facility: CLINIC | Age: 35
End: 2025-02-26
Payer: COMMERCIAL

## 2025-02-26 NOTE — PROGRESS NOTES
February 26, 2025    Hello, may I speak with Jolly Velazquez?    My name is LB     I am  with [facility]  [address]  [facility] [id number] [phone].    Before we get started may I verify your date of birth? 1990    I am calling to officially welcome you to our practice and ask about your recent visit. Is this a good time to talk? yes    Tell me about your visit with us. What things went well?  EVERYTHING WENT WELL.       We're always looking for ways to make our patients' experiences even better. Do you have recommendations on ways we may improve?  no    Overall were you satisfied with your first visit to our practice? yes       I appreciate you taking the time to speak with me today. Is there anything else I can do for you? no      Thank you, and have a great day.